# Patient Record
(demographics unavailable — no encounter records)

---

## 2024-12-05 NOTE — DISCUSSION/SUMMARY
[de-identified] : I discussed nonoperative treatment options for their bilateral knee arthritis. We discussed nonoperative treatments options including activity modification, therapy, bracing, nonsteroidal anti-inflammatory medications, and injections. The patient would like to continue with nonoperative treatment and would like to proceed with activity modification, physical therapy,NSAIDS.  If symptoms persist they will consider an injection I discussed with them that I often prescribe an anti-inflammatory that should be taken once a day with meals to decrease pain and expedite symptom relief. They should not take this while also taking Aleve (Naprosyn), Motrin/ Advil (Ibuprofen), Toradol (ketoralac). They must stop taking it if they develops stomach pain, increased bleeding or bruising and they should follow-up with their primary care doctor for routine blood work including kidney function to monitor its effect. While it Is not a habit-forming substance, it should only  be taken as needed and to discontinue use once symptoms have resolved.  They prefer to continue with the medication   We discussed that when nonoperative treatment is no longer successful and their pain is severe enough that it is affecting their ability to perform activities of daily living, a joint replacement may help them.   My cumulative time spent on this patients visit included: Preparation for the visit, review of the medical records, review of pertinent diagnostic studies, examination and counseling of the patient on the above diagnosis, treatment plan and prognosis, orders of diagnostic tests, medications and/or appropriate procedures and documentation in the medical records of todays visit.

## 2024-12-05 NOTE — HISTORY OF PRESENT ILLNESS
[de-identified] : Patient presents for follow-up evaluation of bilateral knee pain.  Right knee is worse than the left.  She has previously undergone workup for rheumatologic disease and her NOEL was positive with a RAIMUNDO staining pattern of speckled consistent with possible lupus she is seeing a rheumatologist.  She has previously had x-rays at [facility] which show severe medial compartment arthritis of both knees

## 2024-12-05 NOTE — PHYSICAL EXAM
[de-identified] : General Appearance / Station: Well developed, well nourished, in no acute distress  Orientation: Oriented to person, place, and time Gait & Station: Ambulates without assistive device Neurologic: Normal leg sensation  Cardiovascular: Warm extremity  Lymphatics: No lymphedema  Generalized Ligament Laxity: Normal  Stiffness: Normal   RIGHT HIP: Range of motion: Painless  internal and external rotation of the hip. Strength: Within Normal Limits  Palpation: Nontender  at greater trochanter. Nontender  at SI joint Stinchfield: Negative  FADIR: Negative  RADHA: Negative   SYMPTOMATIC RIGHT KNEE: Alignment: VARUS Skin: normal Effusion: none . Quadriceps: normal . Range of motion: symmetric but painful . PF crepitus: 1+. PF apprehension: none . Patella / Patella Tendon: nontender . Lachman's: negative  Valgus @ 30: negative. Varus @ 30: negative. Posterior drawer: negative. Palpation: TENDER AT medial joint line Meniscus signs: MEDIAL JOINT LINE  LEFT HIP: Range of motion: Painless  internal and external rotation of the hip. Strength: Within Normal Limits  Palpation: Nontender  at greater trochanter. Nontender  at SI joint Stinchfield: Negative  FADIR: Negative  RADHA: Negative  SYMPTOMATIC LEFT KNEE: Alignment: Varus Skin: normal Effusion: none . Quadriceps: normal . Range of motion: symmetric but painful . PF crepitus: 1+. PF apprehension: none . Patella / Patella Tendon: nontender . Lachman's: negative  Valgus @ 30: negative. Varus @ 30: negative. Posterior drawer: negative. Palpation: TENDER AT medial joint line Meniscus signs: MEDIAL JOINT LINE  [de-identified] : Outside x-[facility] from [facility] reviewed showing severe medial joint compartment arthritis

## 2024-12-12 NOTE — PHYSICAL EXAM
[de-identified] : General Appearance / Station: Well developed, well nourished, in no acute distress  Orientation: Oriented to person, place, and time Gait & Station: Ambulates without assistive device Neurologic: Normal leg sensation  Cardiovascular: Warm extremity  Lymphatics: No lymphedema  Generalized Ligament Laxity: Normal  Stiffness: Normal   RIGHT HIP: Range of motion: Painless  internal and external rotation of the hip. Strength: Within Normal Limits  Palpation: Nontender  at greater trochanter. Nontender  at SI joint Stinchfield: Negative  FADIR: Negative  RADHA: Negative   SYMPTOMATIC RIGHT KNEE: Alignment: VARUS Skin: normal Effusion: none . Quadriceps: normal . Range of motion: symmetric but painful . PF crepitus: 1+. PF apprehension: none . Patella / Patella Tendon: nontender . Lachman's: negative  Valgus @ 30: negative. Varus @ 30: negative. Posterior drawer: negative. Palpation: TENDER AT medial joint line Meniscus signs: MEDIAL JOINT LINE  LEFT HIP: Range of motion: Painless  internal and external rotation of the hip. Strength: Within Normal Limits  Palpation: Nontender  at greater trochanter. Nontender  at SI joint Stinchfield: Negative  FADIR: Negative  RADHA: Negative  SYMPTOMATIC LEFT KNEE: Alignment: Varus Skin: normal Effusion: none . Quadriceps: normal . Range of motion: symmetric but painful . PF crepitus: 1+. PF apprehension: none . Patella / Patella Tendon: nontender . Lachman's: negative  Valgus @ 30: negative. Varus @ 30: negative. Posterior drawer: negative. Palpation: TENDER AT medial joint line Meniscus signs: MEDIAL JOINT LINE

## 2024-12-12 NOTE — HISTORY OF PRESENT ILLNESS
[de-identified] : Patient presents for follow-up of bilateral knee pain.  The right knee pain has worsened and she is interested in undergoing a right knee injection.  She is starting physical therapy and taking Advil.  Pain is 9 of 10 in severity

## 2024-12-12 NOTE — DISCUSSION/SUMMARY
[de-identified] : I discussed nonoperative treatment options for their right knee arthritis We discussed nonoperative treatments options including activity modification, therapy, bracing, nonsteroidal anti-inflammatory medications, and injections. The patient would like to continue with nonoperative treatment and would like to proceed with activity modification, physical therapy,NSAIDS, steroid injection,    I discussed with them that I often prescribe an anti-inflammatory that should be taken once a day with meals to decrease pain and expedite symptom relief. They should not take this while also taking Aleve (Naprosyn), Motrin/ Advil (Ibuprofen), Toradol (ketoralac). They must stop taking it if they develops stomach pain, increased bleeding or bruising and they should follow-up with their primary care doctor for routine blood work including kidney function to monitor its effect. While it Is not a habit-forming substance, it should only  be taken as needed and to discontinue use once symptoms have resolved.  Continue with over-the-counter medication   We discussed that when nonoperative treatment is no longer successful and their pain is severe enough that it is affecting their ability to perform activities of daily living, a joint replacement may help them.   My cumulative time spent on this patients visit included: Preparation for the visit, review of the medical records, review of pertinent diagnostic studies, examination and counseling of the patient on the above diagnosis, treatment plan and prognosis, orders of diagnostic tests, medications and/or appropriate procedures and documentation in the medical records of todays visit.

## 2024-12-12 NOTE — PROCEDURE
[de-identified] : Injection: Right knee joint. Indication: Osteoarthritis.   A discussion was had with the patient regarding this procedure and all questions were answered. All risks, benefits and alternatives were discussed. These included but were not limited to bleeding, infection, allergic reaction and reaccumulation of fluid. A timeout was done to ensure correct side and patient agreed to the procedure.  A [place] stephanie was created on the skin utilizing a plastic needle cap to stephanie the anticipated point of entry. Alcohol was used to clean the skin, and chloraprep was used to sterilize and prep the area in the lateral joint line aspect of the knee. Ethyl chloride spray was then used as a topical anesthetic. A 22-gauge needle was used to inject 4cc 1% lidocaine without epinephrine  and 1cc of 40mg/ml methylprednisolone into the knee. A sterile bandage was then applied. The patient tolerated the procedure well.

## 2024-12-19 NOTE — HISTORY OF PRESENT ILLNESS
[de-identified] : Patient presents for follow-up of right knee pain.  She had an injection last week which helped significantly.  She is experiencing some left knee pain that is worsened since the last visit.  She is interested in a left knee injection

## 2024-12-19 NOTE — PHYSICAL EXAM
[de-identified] : General Appearance / Station: Well developed, well nourished, in no acute distress  Orientation: Oriented to person, place, and time Gait & Station: Ambulates without assistive device Neurologic: Normal leg sensation  Cardiovascular: Warm extremity  Lymphatics: No lymphedema  Generalized Ligament Laxity: Normal  Stiffness: Normal   RIGHT HIP: Range of motion: Painless  internal and external rotation of the hip. Strength: Within Normal Limits  Palpation: Nontender  at greater trochanter. Nontender  at SI joint Stinchfield: Negative  FADIR: Negative  RADHA: Negative   SYMPTOMATIC RIGHT KNEE: Alignment: VARUS Skin: normal Effusion: none . Quadriceps: normal . Range of motion: symmetric but painful . PF crepitus: 1+. PF apprehension: none . Patella / Patella Tendon: nontender . Lachman's: negative  Valgus @ 30: negative. Varus @ 30: negative. Posterior drawer: negative. Palpation: TENDER AT medial joint line Meniscus signs: MEDIAL JOINT LINE  LEFT HIP: Range of motion: Painless  internal and external rotation of the hip. Strength: Within Normal Limits  Palpation: Nontender  at greater trochanter. Nontender  at SI joint Stinchfield: Negative  FADIR: Negative  RADHA: Negative  SYMPTOMATIC LEFT KNEE: Alignment: Varus Skin: normal Effusion: none . Quadriceps: normal . Range of motion: symmetric but painful . PF crepitus: 1+. PF apprehension: none . Patella / Patella Tendon: nontender . Lachman's: negative  Valgus @ 30: negative. Varus @ 30: negative. Posterior drawer: negative. Palpation: TENDER AT medial joint line Meniscus signs: MEDIAL JOINT LINE

## 2024-12-19 NOTE — PROCEDURE
[de-identified] : Injection: Left knee joint. Indication: Osteoarthritis.   A discussion was had with the patient regarding this procedure and all questions were answered. All risks, benefits and alternatives were discussed. These included but were not limited to bleeding, infection, allergic reaction and reaccumulation of fluid. A timeout was done to ensure correct side and patient agreed to the procedure.  A [place] stephanie was created on the skin utilizing a plastic needle cap to stephanie the anticipated point of entry. Alcohol was used to clean the skin, and chloraprep was used to sterilize and prep the area in the lateral joint line aspect of the knee. Ethyl chloride spray was then used as a topical anesthetic. A 22-gauge needle was used to inject 4cc 1% lidocaine without epinephrine,  and 1cc of 40mg/ml methylprednisolone into the knee. A sterile bandage was then applied. The patient tolerated the procedure well.

## 2024-12-19 NOTE — DISCUSSION/SUMMARY
[de-identified] : I discussed nonoperative treatment options for their right and left knee arthritis We discussed nonoperative treatments options including activity modification, therapy, bracing, nonsteroidal anti-inflammatory medications, and injections. The patient would like to continue with nonoperative treatment and would like to proceed with activity modification, physical therapy, NSAIDS, steroid injection,    I discussed with them that I often prescribe an anti-inflammatory that should be taken once a day with meals to decrease pain and expedite symptom relief. They should not take this while also taking Aleve (Naprosyn), Motrin/ Advil (Ibuprofen), Toradol (ketoralac). They must stop taking it if they develops stomach pain, increased bleeding or bruising and they should follow-up with their primary care doctor for routine blood work including kidney function to monitor its effect. While it Is not a habit-forming substance, it should only  be taken as needed and to discontinue use once symptoms have resolved.  Continue with over-the-counter medication   We discussed that when nonoperative treatment is no longer successful and their pain is severe enough that it is affecting their ability to perform activities of daily living, a joint replacement may help them.   My cumulative time spent on this patients visit included: Preparation for the visit, review of the medical records, review of pertinent diagnostic studies, examination and counseling of the patient on the above diagnosis, treatment plan and prognosis, orders of diagnostic tests, medications and/or appropriate procedures and documentation in the medical records of todays visit.

## 2025-03-13 NOTE — PHYSICAL EXAM
[de-identified] : General Appearance / Station: Well developed, well nourished, in no acute distress  Orientation: Oriented to person, place, and time Gait & Station: Ambulates without assistive device Neurologic: Normal leg sensation  Cardiovascular: Warm extremity  Lymphatics: No lymphedema  Generalized Ligament Laxity: Normal  Stiffness: Normal   RIGHT HIP: Range of motion: Painless  internal and external rotation of the hip. Strength: Within Normal Limits  Palpation: Nontender  at greater trochanter. Nontender  at SI joint Stinchfield: Negative  FADIR: Negative  RADHA: Negative   SYMPTOMATIC RIGHT KNEE: Alignment: VARUS Skin: normal Effusion: none . Quadriceps: normal . Range of motion: symmetric but painful . PF crepitus: 1+. PF apprehension: none . Patella / Patella Tendon: nontender . Lachman's: negative  Valgus @ 30: negative. Varus @ 30: negative. Posterior drawer: negative. Palpation: TENDER AT medial joint line Meniscus signs: MEDIAL JOINT LINE  LEFT HIP: Range of motion: Painless  internal and external rotation of the hip. Strength: Within Normal Limits  Palpation: Nontender  at greater trochanter. Nontender  at SI joint Stinchfield: Negative  FADIR: Negative  RADHA: Negative  SYMPTOMATIC LEFT KNEE: Alignment: Varus Skin: normal Effusion: none . Quadriceps: normal . Range of motion: symmetric but painful . PF crepitus: 1+. PF apprehension: none . Patella / Patella Tendon: nontender . Lachman's: negative  Valgus @ 30: negative. Varus @ 30: negative. Posterior drawer: negative. Palpation: TENDER AT medial joint line Meniscus signs: MEDIAL JOINT LINE

## 2025-03-13 NOTE — HISTORY OF PRESENT ILLNESS
[de-identified] : Patient presents for follow-up of bilateral knee pain.  She has previously had x-rays at [facility] that show severe bilateral knee arthritis.  She had previous injections that lasted 2 weeks.  She is doing physical therapy once a week which shows mild improvement and has not resolved her symptoms

## 2025-03-13 NOTE — DISCUSSION/SUMMARY
[de-identified] : I discussed nonoperative treatment options for their right and left knee arthritis We discussed nonoperative treatments options including activity modification, therapy, bracing, nonsteroidal anti-inflammatory medications, and injections. The patient would like to continue with nonoperative treatment and would like to proceed with activity modification, physical therapy, NSAIDS, steroid injection,    I discussed with them that I often prescribe an anti-inflammatory that should be taken once a day with meals to decrease pain and expedite symptom relief. They should not take this while also taking Aleve (Naprosyn), Motrin/ Advil (Ibuprofen), Toradol (ketoralac). They must stop taking it if they develops stomach pain, increased bleeding or bruising and they should follow-up with their primary care doctor for routine blood work including kidney function to monitor its effect. While it Is not a habit-forming substance, it should only  be taken as needed and to discontinue use once symptoms have resolved.  Continue with over-the-counter medication   We discussed that when nonoperative treatment is no longer successful and their pain is severe enough that it is affecting their ability to perform activities of daily living, a joint replacement may help them.   My cumulative time spent on this patients visit included: Preparation for the visit, review of the medical records, review of pertinent diagnostic studies, examination and counseling of the patient on the above diagnosis, treatment plan and prognosis, orders of diagnostic tests, medications and/or appropriate procedures and documentation in the medical records of todays visit.

## 2025-05-15 NOTE — PHYSICAL EXAM
[de-identified] : General Appearance / Station: Well developed, well nourished, in no acute distress  Orientation: Oriented to person, place, and time Gait & Station: Ambulates without assistive device Neurologic: Normal leg sensation  Cardiovascular: Warm extremity  Lymphatics: No lymphedema  Generalized Ligament Laxity: Normal  Stiffness: Normal   RIGHT HIP: Range of motion: Painless  internal and external rotation of the hip. Strength: Within Normal Limits  Palpation: Nontender  at greater trochanter. Nontender  at SI joint Stinchfield: Negative  FADIR: Negative  RADHA: Negative   SYMPTOMATIC RIGHT KNEE: Alignment: VARUS Skin: normal Effusion: none . Quadriceps: normal . Range of motion: symmetric but painful . PF crepitus: 1+. PF apprehension: none . Patella / Patella Tendon: nontender . Lachman's: negative  Valgus @ 30: negative. Varus @ 30: negative. Posterior drawer: negative. Palpation: TENDER AT medial joint line Meniscus signs: MEDIAL JOINT LINE  LEFT HIP: Range of motion: Painless  internal and external rotation of the hip. Strength: Within Normal Limits  Palpation: Nontender  at greater trochanter. Nontender  at SI joint Stinchfield: Negative  FADIR: Negative  RADHA: Negative  SYMPTOMATIC LEFT KNEE: Alignment: Varus Skin: normal Effusion: none . Quadriceps: normal . Range of motion: symmetric but painful . PF crepitus: 1+. PF apprehension: none . Patella / Patella Tendon: nontender . Lachman's: negative  Valgus @ 30: negative. Varus @ 30: negative. Posterior drawer: negative. Palpation: TENDER AT medial joint line Meniscus signs: MEDIAL JOINT LINE

## 2025-05-15 NOTE — HISTORY OF PRESENT ILLNESS
[de-identified] : Patient presents for follow-up of bilateral knee arthritis.  Underwent injections 12/2024 which only helped for a couple of weeks.  Pain is 7 and 10 in severity.  Physical therapy has helped mildly.  Pain is 7 out of 10 in severity.  She is not interested in a knee replacement

## 2025-05-15 NOTE — DISCUSSION/SUMMARY
[de-identified] : I discussed nonoperative treatment options for their bilateral knee arthritis. We discussed nonoperative treatments options including activity modification, therapy, bracing, nonsteroidal anti-inflammatory medications, and injections. The patient would like to continue with nonoperative treatment and would like to proceed with activity modification, home exercises and NSAIDs   I discussed with them that I often prescribe an anti-inflammatory that should be taken once a day with meals to decrease pain and expedite symptom relief. They should not take this while also taking Aleve (Naprosyn), Motrin/ Advil (Ibuprofen), Toradol (ketoralac). They must stop taking it if they develops stomach pain, increased bleeding or bruising and they should follow-up with their primary care doctor for routine blood work including kidney function to monitor its effect. While it Is not a habit-forming substance, it should only  be taken as needed and to discontinue use once symptoms have resolved.   We discussed that when nonoperative treatment is no longer successful and their pain is severe enough that it is affecting their ability to perform activities of daily living, a joint replacement may help them.   My cumulative time spent on this patients visit included: Preparation for the visit, review of the medical records, review of pertinent diagnostic studies, examination and counseling of the patient on the above diagnosis, treatment plan and prognosis, orders of diagnostic tests, medications and/or appropriate procedures and documentation in the medical records of todays visit.